# Patient Record
Sex: FEMALE | Race: WHITE | NOT HISPANIC OR LATINO | Employment: UNEMPLOYED | URBAN - METROPOLITAN AREA
[De-identification: names, ages, dates, MRNs, and addresses within clinical notes are randomized per-mention and may not be internally consistent; named-entity substitution may affect disease eponyms.]

---

## 2020-05-26 ENCOUNTER — TRANSFERRED RECORDS (OUTPATIENT)
Dept: HEALTH INFORMATION MANAGEMENT | Facility: CLINIC | Age: 45
End: 2020-05-26

## 2020-05-31 ENCOUNTER — HOSPITAL ENCOUNTER (EMERGENCY)
Facility: CLINIC | Age: 45
Discharge: STILL ADMITTED OR ADMINISTRATIVE | End: 2020-05-31

## 2020-07-03 ENCOUNTER — HOSPITAL ENCOUNTER (INPATIENT)
Facility: CLINIC | Age: 45
LOS: 3 days | Discharge: HOME | End: 2020-07-06
Attending: FAMILY MEDICINE | Admitting: PSYCHIATRY & NEUROLOGY
Payer: COMMERCIAL

## 2020-07-03 ENCOUNTER — TELEPHONE (OUTPATIENT)
Dept: BEHAVIORAL HEALTH | Facility: CLINIC | Age: 45
End: 2020-07-03

## 2020-07-03 DIAGNOSIS — F13.20 BENZODIAZEPINE DEPENDENCE (H): ICD-10-CM

## 2020-07-03 DIAGNOSIS — F15.10 AMPHETAMINE OR STIMULANT DRUG ABUSE (H): ICD-10-CM

## 2020-07-03 DIAGNOSIS — Z20.822 SEVERE ACUTE RESPIRATORY SYNDROME CORONAVIRUS 2 (SARS-COV-2) INFECTION RULED OUT BY LABORATORY TESTING: ICD-10-CM

## 2020-07-03 DIAGNOSIS — F19.10 POLYSUBSTANCE ABUSE (H): ICD-10-CM

## 2020-07-03 PROBLEM — F13.939 WITHDRAWAL FROM BENZODIAZEPINE, WITH UNSPECIFIED COMPLICATION (H): Status: ACTIVE | Noted: 2020-07-03

## 2020-07-03 LAB
ALBUMIN SERPL-MCNC: 3.7 G/DL (ref 3.4–5)
ALP SERPL-CCNC: 71 U/L (ref 40–150)
ALT SERPL W P-5'-P-CCNC: 25 U/L (ref 0–50)
AMPHETAMINES UR QL SCN: POSITIVE
ANION GAP SERPL CALCULATED.3IONS-SCNC: 4 MMOL/L (ref 3–14)
AST SERPL W P-5'-P-CCNC: 19 U/L (ref 0–45)
BARBITURATES UR QL: NEGATIVE
BASOPHILS # BLD AUTO: 0 10E9/L (ref 0–0.2)
BASOPHILS NFR BLD AUTO: 0.5 %
BENZODIAZ UR QL: POSITIVE
BILIRUB SERPL-MCNC: 0.3 MG/DL (ref 0.2–1.3)
BUN SERPL-MCNC: 21 MG/DL (ref 7–30)
CALCIUM SERPL-MCNC: 8.6 MG/DL (ref 8.5–10.1)
CANNABINOIDS UR QL SCN: NEGATIVE
CHLORIDE SERPL-SCNC: 108 MMOL/L (ref 94–109)
CO2 SERPL-SCNC: 26 MMOL/L (ref 20–32)
COCAINE UR QL: NEGATIVE
CREAT SERPL-MCNC: 0.76 MG/DL (ref 0.52–1.04)
DIFFERENTIAL METHOD BLD: ABNORMAL
EOSINOPHIL # BLD AUTO: 0.1 10E9/L (ref 0–0.7)
EOSINOPHIL NFR BLD AUTO: 1.9 %
ERYTHROCYTE [DISTWIDTH] IN BLOOD BY AUTOMATED COUNT: 12.3 % (ref 10–15)
ETHANOL UR QL SCN: NEGATIVE
GFR SERPL CREATININE-BSD FRML MDRD: >90 ML/MIN/{1.73_M2}
GLUCOSE SERPL-MCNC: 80 MG/DL (ref 70–99)
HCG UR QL: NEGATIVE
HCT VFR BLD AUTO: 38.6 % (ref 35–47)
HGB BLD-MCNC: 12.7 G/DL (ref 11.7–15.7)
IMM GRANULOCYTES # BLD: 0 10E9/L (ref 0–0.4)
IMM GRANULOCYTES NFR BLD: 0 %
LYMPHOCYTES # BLD AUTO: 2.2 10E9/L (ref 0.8–5.3)
LYMPHOCYTES NFR BLD AUTO: 51.5 %
MCH RBC QN AUTO: 29.1 PG (ref 26.5–33)
MCHC RBC AUTO-ENTMCNC: 32.9 G/DL (ref 31.5–36.5)
MCV RBC AUTO: 89 FL (ref 78–100)
MONOCYTES # BLD AUTO: 0.4 10E9/L (ref 0–1.3)
MONOCYTES NFR BLD AUTO: 10.1 %
NEUTROPHILS # BLD AUTO: 1.5 10E9/L (ref 1.6–8.3)
NEUTROPHILS NFR BLD AUTO: 36 %
NRBC # BLD AUTO: 0 10*3/UL
NRBC BLD AUTO-RTO: 0 /100
OPIATES UR QL SCN: NEGATIVE
PLATELET # BLD AUTO: 171 10E9/L (ref 150–450)
POTASSIUM SERPL-SCNC: 4.5 MMOL/L (ref 3.4–5.3)
PROT SERPL-MCNC: 7.7 G/DL (ref 6.8–8.8)
RBC # BLD AUTO: 4.36 10E12/L (ref 3.8–5.2)
SARS-COV-2 PCR COMMENT: NORMAL
SARS-COV-2 RNA SPEC QL NAA+PROBE: NEGATIVE
SARS-COV-2 RNA SPEC QL NAA+PROBE: NORMAL
SODIUM SERPL-SCNC: 138 MMOL/L (ref 133–144)
SPECIMEN SOURCE: NORMAL
SPECIMEN SOURCE: NORMAL
WBC # BLD AUTO: 4.3 10E9/L (ref 4–11)

## 2020-07-03 PROCEDURE — 80320 DRUG SCREEN QUANTALCOHOLS: CPT | Performed by: FAMILY MEDICINE

## 2020-07-03 PROCEDURE — C9803 HOPD COVID-19 SPEC COLLECT: HCPCS | Performed by: FAMILY MEDICINE

## 2020-07-03 PROCEDURE — 25000132 ZZH RX MED GY IP 250 OP 250 PS 637: Performed by: FAMILY MEDICINE

## 2020-07-03 PROCEDURE — 99285 EMERGENCY DEPT VISIT HI MDM: CPT | Performed by: FAMILY MEDICINE

## 2020-07-03 PROCEDURE — 99284 EMERGENCY DEPT VISIT MOD MDM: CPT | Mod: Z6 | Performed by: FAMILY MEDICINE

## 2020-07-03 PROCEDURE — 36415 COLL VENOUS BLD VENIPUNCTURE: CPT | Performed by: FAMILY MEDICINE

## 2020-07-03 PROCEDURE — 80053 COMPREHEN METABOLIC PANEL: CPT | Performed by: FAMILY MEDICINE

## 2020-07-03 PROCEDURE — 12800008 ZZH R&B CD ADULT

## 2020-07-03 PROCEDURE — U0003 INFECTIOUS AGENT DETECTION BY NUCLEIC ACID (DNA OR RNA); SEVERE ACUTE RESPIRATORY SYNDROME CORONAVIRUS 2 (SARS-COV-2) (CORONAVIRUS DISEASE [COVID-19]), AMPLIFIED PROBE TECHNIQUE, MAKING USE OF HIGH THROUGHPUT TECHNOLOGIES AS DESCRIBED BY CMS-2020-01-R: HCPCS | Performed by: FAMILY MEDICINE

## 2020-07-03 PROCEDURE — 85025 COMPLETE CBC W/AUTO DIFF WBC: CPT | Performed by: FAMILY MEDICINE

## 2020-07-03 PROCEDURE — 81025 URINE PREGNANCY TEST: CPT | Performed by: FAMILY MEDICINE

## 2020-07-03 PROCEDURE — 80307 DRUG TEST PRSMV CHEM ANLYZR: CPT | Performed by: FAMILY MEDICINE

## 2020-07-03 PROCEDURE — 25000132 ZZH RX MED GY IP 250 OP 250 PS 637: Performed by: PSYCHIATRY & NEUROLOGY

## 2020-07-03 RX ORDER — BUPRENORPHINE AND NALOXONE 4; 1 MG/1; MG/1
1 FILM, SOLUBLE BUCCAL; SUBLINGUAL DAILY
COMMUNITY
End: 2020-07-03

## 2020-07-03 RX ORDER — BUPRENORPHINE AND NALOXONE 8; 2 MG/1; MG/1
1 FILM, SOLUBLE BUCCAL; SUBLINGUAL 2 TIMES DAILY
Status: ON HOLD | COMMUNITY
End: 2020-07-06

## 2020-07-03 RX ORDER — DEXTROAMPHETAMINE SULFATE 20 MG/1
40 TABLET ORAL
COMMUNITY
End: 2020-07-03

## 2020-07-03 RX ORDER — PHENOBARBITAL 32.4 MG/1
32.4 TABLET ORAL 3 TIMES DAILY
Status: COMPLETED | OUTPATIENT
Start: 2020-07-03 | End: 2020-07-04

## 2020-07-03 RX ORDER — ALUMINA, MAGNESIA, AND SIMETHICONE 2400; 2400; 240 MG/30ML; MG/30ML; MG/30ML
30 SUSPENSION ORAL EVERY 4 HOURS PRN
Status: DISCONTINUED | OUTPATIENT
Start: 2020-07-03 | End: 2020-07-06 | Stop reason: HOSPADM

## 2020-07-03 RX ORDER — ONDANSETRON 4 MG/1
4 TABLET, ORALLY DISINTEGRATING ORAL EVERY 6 HOURS PRN
Status: DISCONTINUED | OUTPATIENT
Start: 2020-07-03 | End: 2020-07-06 | Stop reason: HOSPADM

## 2020-07-03 RX ORDER — NICOTINE 21 MG/24HR
1 PATCH, TRANSDERMAL 24 HOURS TRANSDERMAL DAILY
Status: DISCONTINUED | OUTPATIENT
Start: 2020-07-03 | End: 2020-07-06 | Stop reason: HOSPADM

## 2020-07-03 RX ORDER — ACETAMINOPHEN 325 MG/1
650 TABLET ORAL EVERY 4 HOURS PRN
Status: DISCONTINUED | OUTPATIENT
Start: 2020-07-03 | End: 2020-07-06 | Stop reason: HOSPADM

## 2020-07-03 RX ORDER — IBUPROFEN 600 MG/1
600 TABLET, FILM COATED ORAL EVERY 6 HOURS PRN
Status: DISCONTINUED | OUTPATIENT
Start: 2020-07-03 | End: 2020-07-06 | Stop reason: HOSPADM

## 2020-07-03 RX ORDER — BUPRENORPHINE AND NALOXONE 8; 2 MG/1; MG/1
1 FILM, SOLUBLE BUCCAL; SUBLINGUAL 2 TIMES DAILY
Status: DISCONTINUED | OUTPATIENT
Start: 2020-07-03 | End: 2020-07-06 | Stop reason: HOSPADM

## 2020-07-03 RX ORDER — LOPERAMIDE HCL 2 MG
2 CAPSULE ORAL 4 TIMES DAILY PRN
Status: DISCONTINUED | OUTPATIENT
Start: 2020-07-03 | End: 2020-07-06 | Stop reason: HOSPADM

## 2020-07-03 RX ORDER — TRAZODONE HYDROCHLORIDE 50 MG/1
50 TABLET, FILM COATED ORAL
Status: DISCONTINUED | OUTPATIENT
Start: 2020-07-03 | End: 2020-07-06 | Stop reason: HOSPADM

## 2020-07-03 RX ORDER — DEXTROAMPHETAMINE SULFATE 15 MG/1
30 CAPSULE, EXTENDED RELEASE ORAL DAILY
Status: ON HOLD | COMMUNITY
End: 2020-07-06

## 2020-07-03 RX ORDER — ZOLPIDEM TARTRATE 10 MG/1
10 TABLET ORAL
Status: ON HOLD | COMMUNITY
End: 2020-07-06

## 2020-07-03 RX ORDER — DEXTROAMPHETAMINE SULFATE 10 MG/1
10 TABLET ORAL DAILY
Status: ON HOLD | COMMUNITY
End: 2020-07-06

## 2020-07-03 RX ORDER — HYDROXYZINE HYDROCHLORIDE 25 MG/1
25 TABLET, FILM COATED ORAL EVERY 4 HOURS PRN
Status: DISCONTINUED | OUTPATIENT
Start: 2020-07-03 | End: 2020-07-06 | Stop reason: HOSPADM

## 2020-07-03 RX ORDER — ONDANSETRON 8 MG/1
8 TABLET, ORALLY DISINTEGRATING ORAL EVERY 8 HOURS PRN
Status: ON HOLD | COMMUNITY
End: 2020-07-06

## 2020-07-03 RX ADMIN — PHENOBARBITAL 32.4 MG: 32.4 TABLET ORAL at 22:38

## 2020-07-03 RX ADMIN — NICOTINE POLACRILEX 4 MG: 4 GUM, CHEWING BUCCAL at 19:13

## 2020-07-03 RX ADMIN — BUPRENORPHINE AND NALOXONE 1 FILM: 8; 2 FILM, SOLUBLE BUCCAL; SUBLINGUAL at 22:38

## 2020-07-03 ASSESSMENT — ACTIVITIES OF DAILY LIVING (ADL)
BATHING: 0-->INDEPENDENT
AMBULATION: 0-->INDEPENDENT
LAUNDRY: WITH SUPERVISION
COGNITION: 0 - NO COGNITION ISSUES REPORTED
TOILETING: 0-->INDEPENDENT
ORAL_HYGIENE: INDEPENDENT
RETIRED_COMMUNICATION: 0-->UNDERSTANDS/COMMUNICATES WITHOUT DIFFICULTY
DRESS: INDEPENDENT
DRESS: 0-->INDEPENDENT
SWALLOWING: 0-->SWALLOWS FOODS/LIQUIDS WITHOUT DIFFICULTY
FALL_HISTORY_WITHIN_LAST_SIX_MONTHS: NO
HYGIENE/GROOMING: INDEPENDENT
TRANSFERRING: 0-->INDEPENDENT
RETIRED_EATING: 0-->INDEPENDENT

## 2020-07-03 NOTE — ED PROVIDER NOTES
"    [facility] EMERGENCY DEPARTMENT ([facility])     July 3, 2020    History     Chief Complaint   Patient presents with     Addiction Problem     detox from Benzos; 10-13 tablets daily.     The history is provided by the patient and medical records.     Brenda Ballesteros is a 44 year old female with a past medical history significant for benzodiazepine abuse and  who presents to the Emergency Department seeking detoxification from benzodiazepines.    Patient reports she last used benzodiazepines 1 hour PTA. She states there has been ongoing use for approximately a year.  Patient states she averages about 10 pills and is unsure of the components.  She notes the name \"triazolam \"and orders them from the Internet.  Patient reports she gets sweaty, restless and anxious when she stops taking them.  She notes she has never been off of them long enough to get withdrawal seizures.  Patient also reports using Suboxone prescribed to her from [facility].  She notes she wants to continue it if possible.  Patient reports using methamphetamine concurrently with benzodiazepines.  She states her last use was yesterday.  Patient states she usually ingests it.  Patient denies alcohol use.      PAST MEDICAL HISTORY: No past medical history on file.    PAST SURGICAL HISTORY: No past surgical history on file.    Past medical history, past surgical history, medications, and allergies were reviewed with the patient. Additional pertinent items: None    FAMILY HISTORY: No family history on file.    SOCIAL HISTORY:   Social History     Tobacco Use     Smoking status: Current Every Day Smoker     Packs/day: 0.50   Substance Use Topics     Alcohol use: No     Social history was reviewed with the patient. Additional pertinent items: None      Patient's Medications   New Prescriptions    No medications on file   Previous Medications    ASCORBIC ACID (VITAMIN C) 500 MG CHEW    Take by mouth daily    BUPRENORPHINE HCL-NALOXONE HCL " (SUBOXONE) 8-2 MG PER FILM    Place 1 Film under the tongue daily    DEXTROAMPHETAMINE (DEXEDRINE SPANSULE) 15 MG 24 HR CAPSULE    Take 30 mg by mouth daily    DEXTROAMPHETAMINE (DEXTROSTAT) 10 MG TABLET    Take 10 mg by mouth daily    ONDANSETRON (ZOFRAN-ODT) 8 MG ODT TAB    Take 8 mg by mouth every 8 hours as needed for nausea   Modified Medications    No medications on file   Discontinued Medications    BUPRENORPHINE HCL-NALOXONE HCL (SUBOXONE) 4-1 MG PER FILM    Place 1 Film under the tongue daily    DEXTROAMPHETAMINE SULFATE 20 MG TABS    Take 40 mg by mouth        No Known Allergies     Review of Systems   Psychiatric/Behavioral:        Positive for seeking detoxification for benzo addication   All other systems reviewed and are negative.    A complete review of systems was performed with pertinent positives and negatives noted in the HPI, and all other systems negative.    Physical Exam   BP: (!) 128/103  Pulse: 102  Temp: 97.8  F (36.6  C)  Resp: 16  Weight: (pt refused)  SpO2: 98 %      Physical Exam  Constitutional:       General: She is not in acute distress.     Appearance: She is not diaphoretic.   HENT:      Head: Atraumatic.      Mouth/Throat:      Pharynx: No oropharyngeal exudate.   Eyes:      General: No scleral icterus.     Pupils: Pupils are equal, round, and reactive to light.   Cardiovascular:      Heart sounds: Normal heart sounds.   Pulmonary:      Effort: No respiratory distress.      Breath sounds: Normal breath sounds.   Abdominal:      General: Bowel sounds are normal.      Palpations: Abdomen is soft.      Tenderness: There is no abdominal tenderness.   Musculoskeletal:         General: No tenderness.   Skin:     General: Skin is warm.      Findings: No rash.         ED Course   6:16 PM  The patient was seen and examined by Dr. Weller in Room ED16B.        Procedures                           Results for orders placed or performed during the hospital encounter of 07/03/20 (from the past  24 hour(s))   HCG qualitative urine (UPT)   Result Value Ref Range    HCG Qual Urine Negative NEG^Negative   Drug abuse screen 6 urine (chem dep)   Result Value Ref Range    Amphetamine Qual Urine Positive (A) NEG^Negative    Barbiturates Qual Urine Negative NEG^Negative    Benzodiazepine Qual Urine Positive (A) NEG^Negative    Cannabinoids Qual Urine Negative NEG^Negative    Cocaine Qual Urine Negative NEG^Negative    Ethanol Qual Urine Negative NEG^Negative    Opiates Qualitative Urine Negative NEG^Negative   CBC with platelets differential   Result Value Ref Range    WBC 4.3 4.0 - 11.0 10e9/L    RBC Count 4.36 3.8 - 5.2 10e12/L    Hemoglobin 12.7 11.7 - 15.7 g/dL    Hematocrit 38.6 35.0 - 47.0 %    MCV 89 78 - 100 fl    MCH 29.1 26.5 - 33.0 pg    MCHC 32.9 31.5 - 36.5 g/dL    RDW 12.3 10.0 - 15.0 %    Platelet Count 171 150 - 450 10e9/L    Diff Method Automated Method     % Neutrophils 36.0 %    % Lymphocytes 51.5 %    % Monocytes 10.1 %    % Eosinophils 1.9 %    % Basophils 0.5 %    % Immature Granulocytes 0.0 %    Nucleated RBCs 0 0 /100    Absolute Neutrophil 1.5 (L) 1.6 - 8.3 10e9/L    Absolute Lymphocytes 2.2 0.8 - 5.3 10e9/L    Absolute Monocytes 0.4 0.0 - 1.3 10e9/L    Absolute Eosinophils 0.1 0.0 - 0.7 10e9/L    Absolute Basophils 0.0 0.0 - 0.2 10e9/L    Abs Immature Granulocytes 0.0 0 - 0.4 10e9/L    Absolute Nucleated RBC 0.0    Comprehensive metabolic panel   Result Value Ref Range    Sodium 138 133 - 144 mmol/L    Potassium 4.5 3.4 - 5.3 mmol/L    Chloride 108 94 - 109 mmol/L    Carbon Dioxide 26 20 - 32 mmol/L    Anion Gap 4 3 - 14 mmol/L    Glucose 80 70 - 99 mg/dL    Urea Nitrogen 21 7 - 30 mg/dL    Creatinine 0.76 0.52 - 1.04 mg/dL    GFR Estimate >90 >60 mL/min/[1.73_m2]    GFR Estimate If Black >90 >60 mL/min/[1.73_m2]    Calcium 8.6 8.5 - 10.1 mg/dL    Bilirubin Total 0.3 0.2 - 1.3 mg/dL    Albumin 3.7 3.4 - 5.0 g/dL    Protein Total 7.7 6.8 - 8.8 g/dL    Alkaline Phosphatase 71 40 - 150 U/L     ALT 25 0 - 50 U/L    AST 19 0 - 45 U/L     Medications   nicotine polacrilex (NICORETTE) gum 4 mg (4 mg Buccal Given 7/3/20 1913)             Assessments & Plan (with Medical Decision Making)   44-year-old woman with a history of polysubstance abuse is here seeking detox from benzodiazepines.  There is a long history of habitual use and physical dependence.  The patient has also been on Suboxone maintenance and would like to continue that if possible.  In the emergency department she is pleasant, cooperative, and does not appear significantly intoxicated or in withdrawal.  She denies other medical or psychiatric concerns.  Her labs reveal amphetamines and benzodiazepines in the urine, otherwise unremarkable.  She appears to be medically stable and an appropriate candidate for voluntary detox admission.    I have reviewed the nursing notes.    I have reviewed the findings, diagnosis, plan and need for follow up with the patient.    New Prescriptions    No medications on file       Final diagnoses:   Benzodiazepine dependence (H)   Polysubstance abuse (H)     I, John To, am serving as a trained medical scribe to document services personally performed by Anthony York MD, based on the provider's statements to me.     IAnthony MD, was physically present and have reviewed and verified the accuracy of this note documented by John To.    7/3/2020   [facility], [place], EMERGENCY DEPARTMENT     Anthony York MD  07/03/20 2021

## 2020-07-03 NOTE — TELEPHONE ENCOUNTER
S: [place], [place] [facility], 44/F, benzo detox     B: Pt reports using a kevon of xanex, triazalam, clonopin for about the last year, daily 10-15 tablets based on what she can get ahold of   ALMAZ an hour before she came in   Pt reports she takes them right in the morning otherwise she will get sweaty  Pt reports she is on suboxone through [facility] in [facility] - pt reports she would like to continue the suboxone  Pt reports meth use, ALMAZ yesterday via ingestion, no IV use  Pt denies medical or MH concerns     Medically cleared, eating, drinking, ambulating independently   Patient cleared and ready for behavioral bed placement: Yes   No covid symptoms, swab is ordered    A: Voluntary     R: 3A/Jose    637pm - Intake awaiting lab collection and results   747pm - All labs resulted, Jose, on call provider, paged   749pm - Jose accepts  Pt placed in que   752pm - unit charge notified, 830pm for report  754pm - ED notified via text page
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
## 2020-07-04 PROCEDURE — 99207 ZZC CDG-CODE CATEGORY CHANGED: CPT | Performed by: PHYSICIAN ASSISTANT

## 2020-07-04 PROCEDURE — 99221 1ST HOSP IP/OBS SF/LOW 40: CPT | Performed by: PHYSICIAN ASSISTANT

## 2020-07-04 PROCEDURE — 99222 1ST HOSP IP/OBS MODERATE 55: CPT | Mod: 95 | Performed by: PSYCHIATRY & NEUROLOGY

## 2020-07-04 PROCEDURE — 12800008 ZZH R&B CD ADULT

## 2020-07-04 PROCEDURE — 25000132 ZZH RX MED GY IP 250 OP 250 PS 637: Performed by: PSYCHIATRY & NEUROLOGY

## 2020-07-04 RX ORDER — PHENOBARBITAL 32.4 MG/1
32.4 TABLET ORAL 2 TIMES DAILY
Status: COMPLETED | OUTPATIENT
Start: 2020-07-05 | End: 2020-07-06

## 2020-07-04 RX ORDER — PHENOBARBITAL 32.4 MG/1
32.4 TABLET ORAL 2 TIMES DAILY
Status: DISCONTINUED | OUTPATIENT
Start: 2020-07-05 | End: 2020-07-04

## 2020-07-04 RX ADMIN — NICOTINE 1 PATCH: 21 PATCH, EXTENDED RELEASE TRANSDERMAL at 08:23

## 2020-07-04 RX ADMIN — PHENOBARBITAL 32.4 MG: 32.4 TABLET ORAL at 14:10

## 2020-07-04 RX ADMIN — PHENOBARBITAL 32.4 MG: 32.4 TABLET ORAL at 08:23

## 2020-07-04 RX ADMIN — BUPRENORPHINE AND NALOXONE 1 FILM: 8; 2 FILM, SOLUBLE BUCCAL; SUBLINGUAL at 20:18

## 2020-07-04 RX ADMIN — PHENOBARBITAL 32.4 MG: 32.4 TABLET ORAL at 20:18

## 2020-07-04 RX ADMIN — TRAZODONE HYDROCHLORIDE 50 MG: 50 TABLET ORAL at 20:35

## 2020-07-04 RX ADMIN — BUPRENORPHINE AND NALOXONE 1 FILM: 8; 2 FILM, SOLUBLE BUCCAL; SUBLINGUAL at 08:22

## 2020-07-04 ASSESSMENT — ACTIVITIES OF DAILY LIVING (ADL)
DRESS: INDEPENDENT
ORAL_HYGIENE: INDEPENDENT
HYGIENE/GROOMING: HANDWASHING;INDEPENDENT

## 2020-07-04 NOTE — H&P
"[facility], [place]   Psychiatric History and Physical  Admission date: 7/3/2020        Chief Complaint:   \"Not doing so well.\"         HPI:     The patient is a 43yo female with a history of benzodiazepine use disorder and opiate use disorder on Suboxone maintenance who was admitted for benzodiazepine withdrawal. Says that she is \"upset with the way they handled my intake.\" Says that staff went through her belongings without her present. Did sign a 72-hour intent to leave but is willing to stay through the weekend to detoxify. Says that she is going to do CD treatment at [place]. Asks about using her own shampoo and conditioner. Mood is \"upset.\" Denies SI or HI. Sleeping well.      Per ER:  Patient reports she last used benzodiazepines 1 hour PTA. She states there has been ongoing use for approximately a year.  Patient states she averages about 10 pills and is unsure of the components.  She notes the name \"triazolam \"and orders them from the Internet.  Patient reports she gets sweaty, restless and anxious when she stops taking them.  She notes she has never been off of them long enough to get withdrawal seizures.  Patient also reports using Suboxone prescribed to her from [facility].  She notes she wants to continue it if possible.  Patient reports using methamphetamine concurrently with benzodiazepines.  She states her last use was yesterday.  Patient states she usually ingests it.  Patient denies alcohol use.        Past Psychiatric History:     No history of suicide attempts.         Substance Use and History:     Opiate use disorder on Suboxone 8mg BID.   Benzodiazepine use disorder        Past Medical History:   PAST MEDICAL HISTORY: No past medical history on file.    PAST SURGICAL HISTORY: No past surgical history on file.          Family History:   FAMILY HISTORY: Denies mental illness or chemical dependency in her family.         Social History:   Please see the full " psychosocial profile from the clinical treatment coordinator.   SOCIAL HISTORY:   Social History     Tobacco Use     Smoking status: Current Every Day Smoker     Packs/day: 0.50   Substance Use Topics     Alcohol use: No            Physical ROS:   The 10-point review of systems was negative except as noted in HPI.         PTA Medications:     Medications Prior to Admission   Medication Sig Dispense Refill Last Dose     Ascorbic Acid (VITAMIN C) 500 MG CHEW Take 500 mg by mouth daily    Past Month     buprenorphine HCl-naloxone HCl (SUBOXONE) 8-2 MG per film Place 1 Film under the tongue 2 times daily    7/3/2020     dextroamphetamine (DEXEDRINE SPANSULE) 15 MG 24 hr capsule Take 30 mg by mouth daily   7/2/2020 at Unknown time     dextroamphetamine (DEXTROSTAT) 10 MG tablet Take 10 mg by mouth daily   7/2/2020 at Unknown time     zolpidem (AMBIEN) 10 MG tablet Take 10 mg by mouth nightly as needed for sleep        ondansetron (ZOFRAN-ODT) 8 MG ODT tab Take 8 mg by mouth every 8 hours as needed for nausea   More than a month          Allergies:     Allergies   Allergen Reactions     Lamictal [Lamotrigine] Hives          Labs:     Recent Results (from the past 48 hour(s))   HCG qualitative urine (UPT)    Collection Time: 07/03/20  6:15 PM   Result Value Ref Range    HCG Qual Urine Negative NEG^Negative   Drug abuse screen 6 urine (chem dep)    Collection Time: 07/03/20  6:15 PM   Result Value Ref Range    Amphetamine Qual Urine Positive (A) NEG^Negative    Barbiturates Qual Urine Negative NEG^Negative    Benzodiazepine Qual Urine Positive (A) NEG^Negative    Cannabinoids Qual Urine Negative NEG^Negative    Cocaine Qual Urine Negative NEG^Negative    Ethanol Qual Urine Negative NEG^Negative    Opiates Qualitative Urine Negative NEG^Negative   CBC with platelets differential    Collection Time: 07/03/20  6:25 PM   Result Value Ref Range    WBC 4.3 4.0 - 11.0 10e9/L    RBC Count 4.36 3.8 - 5.2 10e12/L    Hemoglobin 12.7  11.7 - 15.7 g/dL    Hematocrit 38.6 35.0 - 47.0 %    MCV 89 78 - 100 fl    MCH 29.1 26.5 - 33.0 pg    MCHC 32.9 31.5 - 36.5 g/dL    RDW 12.3 10.0 - 15.0 %    Platelet Count 171 150 - 450 10e9/L    Diff Method Automated Method     % Neutrophils 36.0 %    % Lymphocytes 51.5 %    % Monocytes 10.1 %    % Eosinophils 1.9 %    % Basophils 0.5 %    % Immature Granulocytes 0.0 %    Nucleated RBCs 0 0 /100    Absolute Neutrophil 1.5 (L) 1.6 - 8.3 10e9/L    Absolute Lymphocytes 2.2 0.8 - 5.3 10e9/L    Absolute Monocytes 0.4 0.0 - 1.3 10e9/L    Absolute Eosinophils 0.1 0.0 - 0.7 10e9/L    Absolute Basophils 0.0 0.0 - 0.2 10e9/L    Abs Immature Granulocytes 0.0 0 - 0.4 10e9/L    Absolute Nucleated RBC 0.0    Comprehensive metabolic panel    Collection Time: 07/03/20  6:25 PM   Result Value Ref Range    Sodium 138 133 - 144 mmol/L    Potassium 4.5 3.4 - 5.3 mmol/L    Chloride 108 94 - 109 mmol/L    Carbon Dioxide 26 20 - 32 mmol/L    Anion Gap 4 3 - 14 mmol/L    Glucose 80 70 - 99 mg/dL    Urea Nitrogen 21 7 - 30 mg/dL    Creatinine 0.76 0.52 - 1.04 mg/dL    GFR Estimate >90 >60 mL/min/[1.73_m2]    GFR Estimate If Black >90 >60 mL/min/[1.73_m2]    Calcium 8.6 8.5 - 10.1 mg/dL    Bilirubin Total 0.3 0.2 - 1.3 mg/dL    Albumin 3.7 3.4 - 5.0 g/dL    Protein Total 7.7 6.8 - 8.8 g/dL    Alkaline Phosphatase 71 40 - 150 U/L    ALT 25 0 - 50 U/L    AST 19 0 - 45 U/L   Asymptomatic COVID-19 Virus (Coronavirus) by PCR    Collection Time: 07/03/20  6:25 PM    Specimen: Nasopharyngeal   Result Value Ref Range    COVID-19 Virus PCR to [facility] - Source Nasopharyngeal     COVID-19 Virus PCR to [facility] - Result       Test received-See reflex to IDDL test SARS CoV2 (COVID-19) Virus RT-PCR   SARS-CoV-2 COVID-19 Virus (Coronavirus) RT-PCR Nasopharyngeal    Collection Time: 07/03/20  6:25 PM    Specimen: Nasopharyngeal   Result Value Ref Range    SARS-CoV-2 Virus Specimen Source Nasopharyngeal     SARS-CoV-2 PCR Result NEGATIVE     SARS-CoV-2 PCR  "Comment       Testing was performed using the Xpert Xpress SARS-CoV-2 Assay on the [employer] Gene-Xpert   Instrument Systems. Additional information about this Emergency Use Authorization (EUA)   assay can be found via the Lab Guide.            Physical and Psychiatric Examination:     /67   Pulse 63   Temp 98.4  F (36.9  C) (Temporal)   Resp 16   Ht 1.753 m (5' 9\")   SpO2 97%   Weight is 0 lbs 0 oz  There is no height or weight on file to calculate BMI.    Physical Exam:  I have reviewed the physical exam as documented by by the medical team and agree with findings and assessment and have no additional findings to add at this time.    Mental Status Exam:  Appearance: awake, alert and adequately groomed  Attitude:  more cooperative  Eye Contact:  good  Mood:  better  Affect:  mood congruent  Speech:  clear, coherent  Language: fluent and intact in English  Psychomotor, Gait, Musculoskeletal:  no evidence of tardive dyskinesia, dystonia, or tics  Thought Process:  goal oriented  Associations:  no loose associations  Thought Content:  no evidence of suicidal ideation or homicidal ideation and no evidence of psychotic thought  Insight:  fair  Judgement:  fair  Oriented to:  time, person, and place  Attention Span and Concentration:  intact  Recent and Remote Memory:  fair  Fund of Knowledge:  appropriate         Admission Diagnoses:      Benzodiazepine dependence (H)  Opiate use disorder         Assessment & Plan:     1) Continue Suboxone 8mg BID.   2) The patient will be detoxified with phenobarbital. 32.4mg TID today, BID tomorrow and daily on Monday. Likely discharge on Monday.   3) Okay for patient to have a handful of home supply of shampoo/conditioner with shower.     Disposition Plan   Reason for ongoing admission: requires detoxification from substance that poses a risk of bodily harm during withdrawal period  Discharge location: Chemical dependency treatment facility  Discharge Medications: not " ordered  Follow-up Appointments: not scheduled  Legal Status: voluntary    Telemedicine Visit: The patient's condition can be safely assessed and treated via synchronous audio and visual telemedicine encounter.      Start Time: 1015  Stop Time: 1025    Reason for Telemedicine Visit: Covid-19    Originating Site (Patient Location): [facility]    Distant Site (Provider Location): Provider home office    Consent:  The patient/guardian has verbally consented to: the potential risks and benefits of telemedicine (video visit) versus in person care; bill my insurance or make self-payment for services provided; and responsibility for payment of non-covered services.     Mode of Communication:  Video Conference via [employer]    As the provider I attest to compliance with applicable laws and regulations related to telemedicine.     Entered by: Thierry Garcia on 7/4/2020 at 11:31 AM

## 2020-07-04 NOTE — PROGRESS NOTES
"Pt irritable and tearful this am, stating that she wants to \"leave right now or she is really going to get pissed off.  This place is for crazy people and I'm not crazy.  I have a bed at [place] and I want to leave right now.\" Pt had signed a 72 hour intent last evening shortly after arrival and on call provider was notified and will see patient today. \"Call him right now.The food here sucks and last night I was treated terrible.\" I explained that he would be calling in shortly and will see her on telemedicine.  Pt reluctantly accepted this, took her am medications and has been on the phone.  Will continue to monitor, reassure and discuss with Dr. Garcia.   "

## 2020-07-04 NOTE — PROGRESS NOTES
[facility] evaluation counselor met with pt to initiate discharge planning.  Pt has had a substance use evaluation done at [facility] in less than 45 days. Pt has signed an MICHAEL requested her assement be sent to [facility] detox. Pt desires to go to [facility] because of her 6 years old duaghter.  Pt's living situation is  Living arrangements: House (renting from someone). Pt's funding source is insurance list [facility]: [place].    SANIA Amezcua

## 2020-07-04 NOTE — PLAN OF CARE
"  Problem: Substance Withdrawal  Goal: Substance Withdrawal  Description: Signs and symptoms of listed problems will be absent or manageable.  Outcome: No Change    S: Pt admitted for Benzodiazepines withdrawal, continued on Suboxone maintenance.  A: MSSA 10, /71   Pulse 93   Temp 97.3  F (36.3  C) (Temporal)   Resp 16   Ht 1.753 m (5' 9\")   SpO2 97%  On scheduled phenobarbital and appears to be tolerating this dose.  Reports she did not sleep well, \"the food is terrible, \" she is tearful, anxious/tense and irritable but denies SI, HI. She has numerous requests for items she brought in from home.  Explained safety precautions and rationale for using hospital supplied items.  She has numerous complaints about the unit and the admission process.  Pt did mention that she had an assessment at [facility] and went there but was told they have no detox? She also mentioned CPS involved but did not go into details.     P:  I reassured her that she would be well taken care of here, will meet with a CM and be set up for treatment if she would like assistance. Will have BA go through belongings and give patient what she can have on the unit.  I did speak with Dr. Garcia and patient will not be discharged, he will see her this am. Will continue to monitor and assist with discharge planning.            "

## 2020-07-04 NOTE — CONSULTS
"  [facility]  Internal Medicine Consult     Brenda Ballesteros MRN# [id number]   Age: 44 year old YOB: 1975     Date of Admission: 7/3/2020  Date of Consult:  7/4/2020    Primary Care Provider: No Ref-Primary, Physician    Requesting Service: Psychiatry  Reason for Consult: General Medical Evaluation     Assessment and Plan/Recommendations:   Brenda Ballesteros is a 45yo F with a hx of benzodiazepine abuse and meth admitted seeking detox from benzos and meth.     Benzodiazepine abuse and withdrawal. No known hx of seizures from withdrawal but pt is certainly at risk. She is actively withdrawing at this time. Defer management to psych, primary team.     Meth abuse and withdrawal. Ingests meth, no IVDU.     Tobacco dependence. Cont nicotine patch.     HCG neg, COVID 19 neg, CMP and CBC unremarkable.     Currently, medically stable and I will be happy to follow up and see again for any intercurrent medical issues. Thank you for the opportunity to be a part of this patient's care.      Dahlia Andrade PA-C  Internal Medicine [facility] Hospitalist  [phone]  July 4, 2020    SUBJECTIVE   CC:   Benzo and meth abuse and withdrawal  \"I want to go home\"   HPI:   Brenda Ballesteros is a 45yo F with a hx of benzodiazepine abuse and meth admitted seeking detox from benzos and meth. Pt has been using ~10 pills of unknown dose of unknown drug name prior to admission. She also endorses using meth at the same time. She denies any IVDU, only ingestion. She has withdrawn from benzos before but denies any hx of withdrawal seizures. She reports that she is actively withdrawing at this time with some restlessness and anxiety, but also reports she doesn't want to be admitted anymore and would like to be discharged and that is stressing her out. She denies any known hx of cardiac or pulmonary illness. She denies any active chest pain, sob, dyspnea, palpitations, abdominal pain, nausea, vomiting, " Dr. Shanique Pierre    Intermittent FMLA for chronic headaches, 1-4 days per month    Please sign off on form:  -Highlight the patient and hit \"Chart\" button. -In Chart Review, w/in the Encounter tab - click 1 time on the Telephone call encounter for 8/30/19. "fevers or chills. She is tolerating po intake - ate all of her breakfast this morning without issue. She has no acute medical concerns at this time.      Past Medical History:   No past medical history on file.      Past Surgical History:    No past surgical history on file.      Social History:     Social History     Tobacco Use     Smoking status: Current Every Day Smoker     Packs/day: 0.50   Substance Use Topics     Alcohol use: No         Family History:   No family history on file.      Allergies:     Allergies   Allergen Reactions     Lamictal [Lamotrigine] Hives         Medications:   Reviewed. Please see MAR     Review of Systems:   10 point ROS of systems including Constitutional, Eyes, Respiratory, Cardiovascular, Gastroenterology, Genitourinary, Integumentary, Muscularskeletal, Psychiatric were all negative except for pertinent positives noted in my HPI.      OBJECTIVE   Physical Exam:   Vitals were reviewed  Blood pressure 120/75, pulse 68, temperature 97  F (36.1  C), temperature source Temporal, resp. rate 16, height 1.753 m (5' 9\"), SpO2 100 %.  General:alert, NAD, tearful, appears anxious, pleasant and cooperative  HEENT: MMM  Cardiovascular: RRR  Lungs:CTAB  Abdomen: + BS, soft with no distention and no tenderness   Vascular: trace peripheral edema, distal pulses palpable  Neurologic: AAO X 3, no focal deficits, no tremors appreciated in UE  Skin: no jaundice, rashes, or lesions on exposed areas of skin         Data:        Lab Results   Component Value Date     07/03/2020    Lab Results   Component Value Date    CHLORIDE 108 07/03/2020    Lab Results   Component Value Date    BUN 21 07/03/2020      Lab Results   Component Value Date    POTASSIUM 4.5 07/03/2020    Lab Results   Component Value Date    CO2 26 07/03/2020    Lab Results   Component Value Date    CR 0.76 07/03/2020        Lab Results   Component Value Date    WBC 4.3 07/03/2020    HGB 12.7 07/03/2020    HCT 38.6 07/03/2020    MCV " 89 07/03/2020     07/03/2020     Lab Results   Component Value Date    WBC 4.3 07/03/2020

## 2020-07-04 NOTE — PROGRESS NOTES
Large bin:   purse w/make up items, laptop, skill and hair products cig, keys, sandals,    Small bin:  Phone, wallet, necklace, ear rings    Valuables # [id number]  $56.00 cash, visa cards(2889,4985)  Master card(8157)  EBT(7568)  [place] IDs(3)  S.S cards(2)    Medication # [id number] pt nurse   07/03/20 3364   Patient Belongings   Did you bring any home meds/supplements to the hospital?  Yes   Disposition of meds  Sent to security/pharmacy per site process   Patient Belongings other (see comments)   Belongings Search Yes   Clothing Search Yes   Second Staff Denice RAMIRES RN   Comment See Notes     A               Admission:  I am responsible for any personal items that are not sent to the safe or pharmacy.  [facility] is not responsible for loss, theft or damage of any property in my possession.    Signature:  _________________________________ Date: _______  Time: _____                                              Staff Signature:  ____________________________ Date: ________  Time: _____      2nd Staff person, if patient is unable/unwilling to sign:    Signature: ________________________________ Date: ________  Time: _____     Discharge:  [facility] has returned all of my personal belongings:    Signature: _________________________________ Date: ________  Time: _____                                          Staff Signature:  ____________________________ Date: ________  Time: _____

## 2020-07-04 NOTE — PHARMACY-ADMISSION MEDICATION HISTORY
Admission Medication History Completed by Pharmacy    See [facility] Admission Navigator for allergy information, preferred outpatient pharmacy, prior to admission medications and immunization status.     Medication History Sources:     Patient, Surescript,     Changes made to PTA medication list (reason):    Added:     Ascorbic Acid     Ondansetron    Deleted: None    Changed:     Suboxone - 4-1 mg film >>> 8-2 mg film (per patient, consistent with dispense report)    Dextroamphetamine - 40 mg daily >>> 30 mg ER daily and 10 mg IR daily (per patient, consistent with dispense report)    Additional Information:    Patient was a reliable medication historian. Patient reports taking Suboxone every day but has not filled since 2/18/20 (60 films for 30 days). Patient also reports taking Sulfamethoxazole-TMP about a month ago for a cyst under her arm, but reports not finishing the full course of therapy and does not report any continued symptoms of infection.       Prior to Admission medications    Medication Sig Last Dose Taking? Auth Provider   Ascorbic Acid (VITAMIN C) 500 MG CHEW Take 500 mg by mouth daily  Past Month Yes Unknown, Entered By History   buprenorphine HCl-naloxone HCl (SUBOXONE) 8-2 MG per film Place 1 Film under the tongue daily 7/3/2020 Yes Unknown, Entered By History   dextroamphetamine (DEXEDRINE SPANSULE) 15 MG 24 hr capsule Take 30 mg by mouth daily 7/1/2020 Yes Unknown, Entered By History   dextroamphetamine (DEXTROSTAT) 10 MG tablet Take 10 mg by mouth daily 7/1/2020 Yes Unknown, Entered By History   ondansetron (ZOFRAN-ODT) 8 MG ODT tab Take 8 mg by mouth every 8 hours as needed for nausea More than a month  Unknown, Entered By History         Date completed: 07/03/20    Medication history completed by: Jose M Jensen

## 2020-07-04 NOTE — PLAN OF CARE
"[place]      Brenda Ballesteros is a 44 year old year old female with a chief complaint of addiction problem      S = Situation:   Patient voluntary admission for benzodiazepine withdrawal      B  = Background:   Patient admitted from the ER seeking detox from benzodiazepine abuse. Patient reports she takes xanax, clonazepam, or triazolam, 10-15 tablets daily for a year.Last use 7/3/2020 prior to coming to the ER .Unable to quantify milligrams. Patient also reports she uses methamphetamines \"habitually\", refused to qualify with how much. Last use 7/2/2020. Patient denies any history of withdrawal seizures from benzodiazepines. Patient reports she has been to one other  inpatient detox and treatment at [facility] and has had one OP CD treatment.Patient denies any mental health admissions or outpatient providers. Patient reports ADHD and past opiate abuse for a medical history.     A  =  Assessment:   Patient affect irritable, angry. Patient mood is irritable. Patient denies SI, HI, SIB, or hallucinations. Patient reported she doesn't like how the admission and check-in were handled. Patient reports she was unhappy about not watching staff search her belongings. Patient verbally disagrees with the mental health policies in place. Patient initially cooperative with writer, then demanded to be discharged. Accused writer of lying to her when she signed the  voluntary form. Patient signed a 72 hour intent to discharge. On-call physician notified. Patient unhappy that certain belongings were not given to her and stated she was assured she could use her laptop on the unit. Patient later threw items from her room into the hallway and ripped off her name band. MSSA 2 during admission assessment.    R =   Request or Recommendation:   Patient is on MSSA monitoring with phenobarbital for benzodiazepine abuse. Patient remains on suboxone maintenance. Is on withdrawal precautions. To be seen in am by psychiatry and internal " medicine.

## 2020-07-05 PROCEDURE — 25000132 ZZH RX MED GY IP 250 OP 250 PS 637: Performed by: PSYCHIATRY & NEUROLOGY

## 2020-07-05 PROCEDURE — 12800008 ZZH R&B CD ADULT

## 2020-07-05 RX ADMIN — BUPRENORPHINE AND NALOXONE 1 FILM: 8; 2 FILM, SOLUBLE BUCCAL; SUBLINGUAL at 20:29

## 2020-07-05 RX ADMIN — PHENOBARBITAL 32.4 MG: 32.4 TABLET ORAL at 10:20

## 2020-07-05 RX ADMIN — PHENOBARBITAL 32.4 MG: 32.4 TABLET ORAL at 20:29

## 2020-07-05 RX ADMIN — BUPRENORPHINE AND NALOXONE 1 FILM: 8; 2 FILM, SOLUBLE BUCCAL; SUBLINGUAL at 10:20

## 2020-07-05 RX ADMIN — NICOTINE 1 PATCH: 21 PATCH, EXTENDED RELEASE TRANSDERMAL at 10:20

## 2020-07-05 ASSESSMENT — ACTIVITIES OF DAILY LIVING (ADL)
ORAL_HYGIENE: INDEPENDENT
LAUNDRY: WITH SUPERVISION
HYGIENE/GROOMING: INDEPENDENT
DRESS: INDEPENDENT

## 2020-07-05 NOTE — PROGRESS NOTES
"Pt requesting to discharge today. \"Im ready to go, just call the doctor, he will let me leave.\" BP 96/64   Pulse 88   Temp 97.7  F (36.5  C) (Temporal)   Resp 16   Ht 1.753 m (5' 9\")   SpO2 99%  Encouraged patient to have breakfast and increase fluids as her BP is on the low end. Pt affect is flat/ irritable, she took her breakfast tray to her room.  She is not social with peers and has been mostly isolative to her room since admission. She is ambivalent about her discharge plan and will need to followup with CM. Discussed with Juan David Garcia and patient will not be discharged today, plan was for  tomorrow, phenobarbital taper starting today.  Pt informed and stated that she has to leave first thing in the morning and I explained that she will need to see Dr. Garcia in the morning and medications will need to ordered prior to discharge. Pt walked away. Will continue to monitor and assist with discharge planning as patient allows.   "

## 2020-07-06 VITALS
SYSTOLIC BLOOD PRESSURE: 105 MMHG | HEIGHT: 69 IN | OXYGEN SATURATION: 89 % | DIASTOLIC BLOOD PRESSURE: 74 MMHG | HEART RATE: 89 BPM | TEMPERATURE: 97.4 F | RESPIRATION RATE: 16 BRPM

## 2020-07-06 PROCEDURE — 25000132 ZZH RX MED GY IP 250 OP 250 PS 637: Performed by: PSYCHIATRY & NEUROLOGY

## 2020-07-06 PROCEDURE — 99239 HOSP IP/OBS DSCHRG MGMT >30: CPT | Mod: 95 | Performed by: PSYCHIATRY & NEUROLOGY

## 2020-07-06 RX ORDER — BUPRENORPHINE AND NALOXONE 8; 2 MG/1; MG/1
1 FILM, SOLUBLE BUCCAL; SUBLINGUAL 2 TIMES DAILY
Qty: 14 FILM | Refills: 0 | Status: SHIPPED | OUTPATIENT
Start: 2020-07-06

## 2020-07-06 RX ADMIN — PHENOBARBITAL 32.4 MG: 32.4 TABLET ORAL at 08:36

## 2020-07-06 RX ADMIN — BUPRENORPHINE AND NALOXONE 1 FILM: 8; 2 FILM, SOLUBLE BUCCAL; SUBLINGUAL at 08:36

## 2020-07-06 NOTE — DISCHARGE INSTRUCTIONS
"Behavioral Discharge Planning and Instructions  THANK YOU FOR CHOOSING THE [address]  [phone]    Summary: You were admitted to [facility] on 7/5/2020. for detoxification from Benzodiazepines.  A medical exam was performed that included lab work. You have met with a  and opted to pursue admission to [facility] after discharge.  Please take care and make your recovery a priority!     Recommendation: Enter and complete a residential treatment program such as [facility] and follow all continuing care recommendations.      Main Diagnosis:  Per. Dr. Garcia  Opiate use disorder on Suboxone   Benzodiazepine use disorder       Major Treatments, Procedures and Findings:   You received treatment for Benzodiazepine withdrawal.  You have met with a  to develop a treatment plan for discharge.  You have had labs drawn and a copy of those labs will be sent home with you.  Please bring your lab results with you to your follow up doctors appointment.  Make your recovery a priority.     Symptoms to Report:  If you experience more anxiety, confusion, sleeplessness, deep sadness or thoughts of suicide, notify your treatment team or notify your primary care physician. IF ANY OF THE SYMPTOMS YOU ARE EXPERIENCING ARE A MEDICAL EMERGENCY CALL 911 IMMEDIATELY.     Lifestyle Adjustment: Adjust your lifestyle to get enough sleep, relaxation, exercise and  good nutrition. Continue to develop healthy coping skills to decrease stress and promote a sober living environment. Do not use alcohol, illegal drugs or addictive medications other than what is currently prescribed. AA, NA, and  Sponsor are excellent resources for support.     Disposition: Home, you endorse a plan to follow up with JOE Rodas on your own.     [facility]  Address: [address] [place] [address].  Phone: [phone]   About: \"[facility] is designed to meet the needs of women who " "have substance use disorders with the mission of rehabilitation. The principal aim is a global change in lifestyle. We offer behavior-based empowerment programs where women strive towards lifestyles free from substance use disorders.\"     Suboxone Maintenance Provider:  [facility]   Address: [address], [place] [address]   Phone: [phone]    Appointment: You report that you will schedule an appointment after discharge      [facility]  Address: [address], [place] [address]  Phone: [phone] or [phone]  Fax: [phone]  You report you had a Rule 25 assessment at [facility] within the last 45 days and opted not to complete a Rule 25 assessment while on unit 3A, or wait for case management to obtain your original Rule 25 to complete an update. You will need to follow up with [facility] for any further case management needs involving your assessment and referral to treatment.     DISCHARGE RESOURCES:  -SMART Recovery - self management for addiction recovery:  www.smartrecovery.org    -[facility] ~ [facility] Crisis [facility] & Support [facility]: 650.383.6173.  -[facility] [phone]   -[facility] Behavioral Intake 689-502-3837 or 322-076-9316.  -Crisis Intervention: 300.302.7497 or 237-264-5152 (TTY: 327.736.2771).  Call anytime.  -Suicide Awareness Voices of Education (SAVE) (www.save.org): 937-441-NWYQ (5896)  -National Suicide Prevention Line (www.mentalhealthmn.org): 871-476-BQAX (0237)  -National [place] on Mental Illness (www.[place].dequan.org): 603.851.9342 or 997-974-6673.  -Szmk9ccuy: text the word LIFE to 69939 for immediate support and crisis intervention  -Mental Health Consumer/Survivor Network of [place] (www.mhcsn.net): 755.669.7706 or 388-259-4433  -Mental Health Association of [place] (www.mentalhealth.org): 782.738.9533 or 876-654-2089     -Substance Abuse and Mental Health " Services (www.sama.gov)  -Harm Reduction Coalition (www. Harmreduction.org)  -www.prescribetoprevent.org or http://prescribetoprevent.org/video  -Poison control 1-284.456.6192     Sober Support Group Information:  AA/NA & Sponsor/Support  -Alcoholics Anonymous (www.alcoholics-anonymous.org): for local information 24 hours/day  -AA Intergroup service office in [place] (http://www.aastpaul.org/) 164.702.7963  -AA Intergroup service office in [facility]: 642.281.9290. (http://www.a[place].org/)  -Narcotics Anonymous (www.nam[place].org) (683) 913-2715   **Sober Fun Activities: www.sober-activities.Convertro/[facility]//[place]    General Medication Instructions:   See your medication sheet(s) for instructions.   Take all medicines as directed.  Make no changes unless your doctor suggests them.   Go to all your doctor visits.  Be sure to have all your required lab tests. This way, your medicines can be refilled on time.  Do not use any drugs not prescribed by your provider.  AA/NA and Sponsors are excellent resources for support  Avoid alcohol.    Please Note:  If you have any questions at anytime after you are discharged please call the [facility] [place] detox unit 3AW unit at [phone].    [facility], Behavioral Intake [phone]    Please take this discharge folder with you to all your follow up appointments, it contains your lab results, diagnosis, medication list and discharge recommendations.      THANK YOU FOR CHOOSING THE [facility]

## 2020-07-06 NOTE — DISCHARGE SUMMARY
"Psychiatric Discharge Summary    Brenda Balelsteros MRN# [id number]   Age: 44 year old YOB: 1975     Date of Admission:  7/3/2020  Date of Discharge:  7/6/2020 10:10 AM  Admitting Physician:  Thierry Garcia MD  Discharge Physician:  Thierry Garcia MD          Event Leading to Hospitalization:   The patient is a 43yo female with a history of benzodiazepine use disorder and opiate use disorder on Suboxone maintenance who was admitted for benzodiazepine withdrawal. Says that she is \"upset with the way they handled my intake.\" Says that staff went through her belongings without her present. Did sign a 72-hour intent to leave but is willing to stay through the weekend to detoxify. Says that she is going to do CD treatment at [place]. Asks about using her own shampoo and conditioner. Mood is \"upset.\" Denies SI or HI. Sleeping well.         See Admission note by Thierry Garcia MD for additional details.          Diagnoses:     Benzodiazepine dependence (H)  Opiate use disorder         Labs:        Lab Results   Component Value Date     07/03/2020    Lab Results   Component Value Date    CHLORIDE 108 07/03/2020    Lab Results   Component Value Date    BUN 21 07/03/2020      Lab Results   Component Value Date    POTASSIUM 4.5 07/03/2020    Lab Results   Component Value Date    CO2 26 07/03/2020    Lab Results   Component Value Date    CR 0.76 07/03/2020          Lab Results   Component Value Date    WBC 4.3 07/03/2020    HGB 12.7 07/03/2020    HCT 38.6 07/03/2020    MCV 89 07/03/2020     07/03/2020     Lab Results   Component Value Date    AST 19 07/03/2020    ALT 25 07/03/2020    ALKPHOS 71 07/03/2020    BILITOTAL 0.3 07/03/2020     No results found for: TSH         Consults:   Consultation during this admission received from internal medicine.  No medical intervention was indicated.           Hospital Course:   Brenda Ballesteros was admitted to Station 3A with attending Thierry " Shamar Garcia MD as a voluntary patient. The patient was placed under status 15 (15 minute checks) to ensure patient safety.   CBC, BMP and utox obtained.    All outpatient medications were continued. The patient was detoxified from opiates using Subutex which was transitioned to Suboxone at discharge. The patient was detoxified from benzodiazepines using a phenobarbital taper.      Brenda Ballesteros did participate in groups and was visible in the milieu.     The patient's symptoms of withdrawal improved.     Brenda Ballesteros was released to  treatment. At the time of discharge Brenda Ballesteros was determined to not be a danger to herself or others. At the current time of discharge, the patient does not meet criteria for involuntary hospitalization. On the day of discharge, the patient reports that they do not have suicidal or homicidal ideation and would never hurt themselves or others. Steps taken to minimize risk include: assessing patient s behavior and thought process daily during hospital stay, discharging patient with adequate plan for follow up for mental and physical health and discussing safety plan of returning to the hospital should the patient ever have thoughts of harming themselves or others. Therefore, based on all available evidence including the factors cited above, the patient does not appear to be at imminent risk for self-harm, and is appropriate for outpatient level of care.           Discharge Medications:     Current Discharge Medication List      CONTINUE these medications which have CHANGED    Details   buprenorphine HCl-naloxone HCl (SUBOXONE) 8-2 MG per film Place 1 Film under the tongue 2 times daily  Qty: 14 Film, Refills: 0    Comments: TIKA: [id number]  Associated Diagnoses: Polysubstance abuse (H)         CONTINUE these medications which have NOT CHANGED    Details   Ascorbic Acid (VITAMIN C) 500 MG CHEW Take 500 mg by mouth daily          STOP taking these medications        dextroamphetamine (DEXEDRINE SPANSULE) 15 MG 24 hr capsule Comments:   Reason for Stopping:         dextroamphetamine (DEXTROSTAT) 10 MG tablet Comments:   Reason for Stopping:         ondansetron (ZOFRAN-ODT) 8 MG ODT tab Comments:   Reason for Stopping:         zolpidem (AMBIEN) 10 MG tablet Comments:   Reason for Stopping:                    Psychiatric Examination:   Appearance:  awake, alert and adequately groomed  Attitude:  cooperative  Eye Contact:  good  Mood:  good  Affect:  mood congruent  Speech:  clear, coherent  Psychomotor Behavior:  no evidence of tardive dyskinesia, dystonia, or tics  Thought Process:  goal oriented  Associations:  no loose associations  Thought Content:  no evidence of suicidal ideation or homicidal ideation and no evidence of psychotic thought  Insight:  fair  Judgment:  intact  Oriented to:  time, person, and place  Attention Span and Concentration:  intact  Recent and Remote Memory:  fair  Language: Able to read and write  Fund of Knowledge: appropriate  Muscle Strength and Tone: normal  Gait and Station: Normal         Discharge Plan:   Continue medications as above.     Major Treatments, Procedures and Findings:   You received treatment for Benzodiazepine withdrawal.  You have met with a  to develop a treatment plan for discharge.  You have had labs drawn and a copy of those labs will be sent home with you.  Please bring your lab results with you to your follow up doctors appointment.  Make your recovery a priority.      Symptoms to Report:  If you experience more anxiety, confusion, sleeplessness, deep sadness or thoughts of suicide, notify your treatment team or notify your primary care physician. IF ANY OF THE SYMPTOMS YOU ARE EXPERIENCING ARE A MEDICAL EMERGENCY CALL 911 IMMEDIATELY.      Lifestyle Adjustment: Adjust your lifestyle to get enough sleep, relaxation, exercise and  good nutrition. Continue to develop healthy coping skills to decrease stress and  "promote a sober living environment. Do not use alcohol, illegal drugs or addictive medications other than what is currently prescribed. AA, NA, and  Sponsor are excellent resources for support.      Disposition: Home, you endorse a plan to follow up with [facility] on your own.      [facility]  Address: [address] [place] [address].  Phone: [phone]   About: \"[facility] is designed to meet the needs of women who have substance use disorders with the mission of rehabilitation. The principal aim is a global change in lifestyle. We offer behavior-based empowerment programs where women strive towards lifestyles free from substance use disorders.\"      Suboxone Maintenance Provider:  [facility]     Address: [address], [place] [address]     Phone: [phone]     Appointment: You report that you will schedule an appointment after discharge       [facility]  Address: [address] [place] [address]  Phone: [phone] or [phone]  Fax: [phone]  You report you had a Rule 25 assessment at [facility] within the last 45 days and opted not to complete a Rule 25 assessment while on unit 3A, or wait for case management to obtain your original Rule 25 to complete an update. You will need to follow up with [facility] for any further case management needs involving your assessment and referral to treatment.      DISCHARGE RESOURCES:  -SMART Recovery - self management for addiction recovery:  www.smartrecovery.org    -[facility] ~ A [facility]: 638.374.3370.  -[facility] [phone]   -[facility] Behavioral Intake [phone] or [phone].  -Crisis Intervention: [phone] or [phone] (TTY: 572.647.1954).  Call anytime.  -Suicide Awareness Voices of Education (SAVE) (www.save.org): 630-704-SAVE " (9432)  -National Suicide Prevention Line (www.mentalhealthmn.org): 042-613-PSLF (8807)  -National [place] on Mental Illness (www.mn.dequan.org): 272.210.8337 or 939-292-4367.  -Yzdq8ejim: text the word LIFE to 08985 for immediate support and crisis intervention  -Mental Health Consumer/Survivor Network of [place] (www.mhcsn.net): 551.387.5968 or 252-237-0063  -Mental Health Association of [place] (www.mentalhealth.org): 543.246.5008 or 452-299-4997     -Substance Abuse and Mental Health Services (www.samhsa.gov)  -Harm Reduction Coalition (www. Harmreduction.org)  -www.prescribetoprevent.org or http://prescribetoprevent.org/video  -Poison control 2-148-433-1749     Attestation:    Telemedicine Visit: The patient's condition can be safely assessed and treated via synchronous audio and visual telemedicine encounter.      Start Time: 0942  Stop Time: 0946    Reason for Telemedicine Visit: Covid-19    Originating Site (Patient Location): [facility]    Distant Site (Provider Location): Provider home office    Consent:  The patient/guardian has verbally consented to: the potential risks and benefits of telemedicine (video visit) versus in person care; bill my insurance or make self-payment for services provided; and responsibility for payment of non-covered services.     Mode of Communication:  Video Conference via [employer]    As the provider I attest to compliance with applicable laws and regulations related to telemedicine.     The patient was seen and evaluated by me. I spent more than 30 minutes on discharge day activities. Thierry Garcia MD

## 2020-07-06 NOTE — PLAN OF CARE
Behavioral Team Discussion: (7/6/2020)    Continued Stay Criteria/Rationale: Patient admitted for benzodiazepine withdrawal, complicated.  Plan: The following services will be provided to the patient; psychiatric assessment, medication management, therapeutic milieu, individual and group support, and skills groups.   Participants: 3A Provider: Dr. Thierry Garcia MD; 3A RN's: Win Taylor RN; 3A CM's: Maki Moreira [facility]  Summary/Recommendation: Providers will assess today for treatment recommendations, discharge planning, and aftercare plans. CM will meet with pt for discharge planning.   Medical/Physical: Internal medicine consult completed 7/4/2020.  Precautions:   Behavioral Orders   Procedures     Code 1 - Restrict to Unit     Routine Programming     As clinically indicated     Status 15     Every 15 minutes.     Withdrawal precautions     Rationale for change in precautions or plan: N/A  Progress: Improving.

## 2020-07-06 NOTE — PROGRESS NOTES
PDMP as of 7/6/2020:     Brenda Favian     Risk Indicators   NARX SCORES   Narcotic   350   Sedative   270   Stimulant   180     OVERDOSE RISK SCORE   450   (Range 000-999)     ADDITIONAL RISK INDICATORS ( 0 )     This NarxCare report is based on search criteria supplied and the data entered by the dispensing pharmacy. For more information about any prescription, please contact the dispensing pharmacy or the prescriber. NarxCare scores and reports are intended to aid, not replace, medical decision making. None of the information presented should be used as sole justification for providing or refusing to provide medications. The information on this report is not warranted as accurate or complete.   Graphs   RX GRAPH   Narcotic Buprenorphine Sedative Stimulant Other   All PrescribersPrescribers2 - Sage Kay1 - Sage Ordonez [id number]     Buprenorphine mg   [id number]   Morphine MgEq (MME)   [id number]   Lorazepam MgEq (LME)   [id number]/[id number]   *Per CDC guidance, the MME conversion factors prescribed or provided as part of the medication-assisted treatment for opioid use disorder should not be used to benchmark against dosage thresholds meant for opioids prescribed for pain. Buprenorphine products have no agreed upon morphine equivalency, and as partial opioid agonists, are not expected to be associated with overdose risk in the same dose-dependent manner as doses for full agonist opioids. MME = morphine milligram equivalents. LME = Lorazepam milligram equivalents. mg = dose in milligrams.   Summary   Summary  Total Prescriptions: 36   Total Prescribers: 2   Total Pharmacies: 3   Narcotics* (excluding Buprenorphine)  Current Qty: 0   Current MME/day: 0.00   30 Day Avg MME/day: 0.00   Sedatives*  Current Qty: 0   Current LME/day: 0.00   30 Day Avg LME/day: 0.00   Buprenorphine*  Current Qty: 0   Current mg/day: 0.00   30 Day Avg mg/day: 0.00   Rx  Data   PRESCRIPTIONS   Total Prescriptions: 36   Total Private Pay: 0     Fill Date ID Written Drug Qty Days Prescriber Rx # Pharmacy Refill Daily Dose * Pymt Type    04/16/2020  3  04/16/2020  Zolpidem Tartrate 10 Mg Tablet    30.00 30 Pa Ekb  [id number]  Gen (1947)  0/0 0.50 [place]   04/16/2020  3  02/18/2020  Dextroamphetamine 10 Mg Tab    30.00 30 Pa Ekb  76530  Gen (1947[place]   04/16/2020  3  02/18/2020  Dextroamphetamine Er 15 Mg Cap    60.00 30 Pa Ekb  54067  Gen (1947[place]   03/16/2020  2  02/18/2020  Dextroamphetamine Er 15 Mg Cap    60.00 30 Pa Ekb  15144  Gen (1947[place]   03/16/2020  2  02/18/2020  Dextroamphetamine 10 Mg Tab    30.00 30 Pa Ekb  53747  Gen (1947[place]   03/16/2020  2  10/09/2019  Zolpidem Tartrate 10 Mg Tablet    30.00 30 Pa Ekb  99305  Gen (1947)  5/5 0.50 [place]   02/18/2020  2  02/18/2020  Dextroamphetamine 10 Mg Tab    30.00 30 Pa Ekb  38855  Gen (1947[place]   02/18/2020  2  02/18/2020  Dextroamphetamine Er 15 Mg Cap    60.00 30 Pa Ekb  63933  Gen (1947[place]   02/18/2020  2  02/18/2020  Suboxone 8 Mg-2 Mg Sl Film    60.00 30 Pa Ekb  07901  Gen (1947)  0/0 16.[place]   02/17/2020  2  10/09/2019  Zolpidem Tartrate 10 Mg Tablet    30.00 30 Pa Ekb  30835  Gen (1947)  4/5 0.50 [place]   01/14/2020  2  10/09/2019  Zolpidem Tartrate 10 Mg Tablet    30.00 30 Pa Ekb  47081  Gen (1947)  3/5 0.50 [place]   01/06/2020  4  11/29/2019  Dextroamphetamine Er 15 Mg Cap    56.00 28 Pa Ekb  [id number]  Hy- (8774)  [place]   01/04/2020  4  11/29/2019  Dextroamphetamine Er 15 Mg Cap    4.00 2 Pa Ekb  [id number]  Hy- (8774)  [place]   01/04/2020  1  11/29/2019  Dextroamphetamine 10 Mg Tab    30.00 30 Pa Ekb  [id number]  Wal (6827[place]  [place]   12/16/2019  2  10/09/2019  Zolpidem Tartrate 10 Mg Tablet    30.00 30 Pa Ekb  21807  Gen (1947)  2/5 0.50 [place]   12/06/2019  1  12/06/2019  Dextroamphetamine Er 15 Mg Cap    60.00 30 Pa Ekb  [id number]  Wal (6827)  [place]   12/06/2019  1  12/06/2019  Suboxone 8 Mg-2 Mg Sl Film    60.00 30 Pa Ekb  [id number]  Wal (6827)  0/0 16.[place]   12/06/2019  1  12/06/2019  Dextroamphetamine 10 Mg Tab    30.00 30 Pa Ekb  [id number]  Wal (6827)  [place]   11/06/2019  2  10/09/2019  Dextroamphetamine Er 15 Mg Cap    60.00 30 Pa Ekb  28518  Gen (1947[place]  [place]   11/06/2019  2  10/09/2019  Dextroamphetamine 10 Mg Tab    30.00 30 Pa Ekb  72640  Gen (1947[place]   11/06/2019  2  10/09/2019  Zolpidem Tartrate 10 Mg Tablet    30.00 30 Pa Ekb  89785  Gen (1947)  1/5 0.50 [place]   10/15/2019  2  10/09/2019  Suboxone 8 Mg-2 Mg Sl Film    60.00 30 Pa Ekb  19351  Gen (1947)  0/0 16.[place]   10/09/2019  2  10/09/2019  Zolpidem Tartrate 10 Mg Tablet    30.00 30 Pa Ekb  77212  Gen (1947)  0/5 0.50 [place]   10/08/2019  2  09/18/2019  Dextroamphetamine 10 Mg Tab    30.00 30 Pa Ekb  84830  Gen (1947[place]   10/08/2019  2  09/18/2019  Dextroamphetamine Er 15 Mg Cap    60.00 30 Pa Ekb  16415  Gen (1947[place]   09/18/2019  2  09/18/2019  Suboxone 8 Mg-2 Mg Sl Film    60.00 30 Pa Ekb  05669  Gen (1947)  0/0 16.[place]   09/09/2019  2  07/08/2019  Dextroamphetamine Er 15 Mg Cap    60.00 30 Pa Ekb  01202  Gen (1947[place]   09/09/2019  2  07/08/2019  Dextroamphetamine 10 Mg Tab    30.00 30 Pa Ekb  06118  Gen (1947[place]   08/29/2019  2  04/04/2019  Zolpidem Tartrate 10 Mg Tablet    30.00 30 Pa Ekb  4387  Gen (1947)  5/5 0.50 [place]   08/07/2019  2  08/07/2019  Suboxone 8 Mg-2 Mg Sl Film    60.00 30 Pa Ekb  8791  Gen (1947)  0/0 16.[place]   08/07/2019  2  08/07/2019  Dextroamphetamine 10 Mg Tab    30.00 30 Pa Ekb  8789  Gen (1947[place]  0/[place]   08/07/2019  2  08/07/2019   Dextroamphetamine Er 15 Mg Cap    60.00 30 Pa Ekb  8788  Gen (1947[place]  0/[place]   07/31/2019  2  04/04/2019  Zolpidem Tartrate 10 Mg Tablet    30.00 30 Pa Ekb  4387  Gen (1947)  4/5 0.50 [place]   07/08/2019  2  07/08/2019  Suboxone 8 Mg-2 Mg Sl Film    60.00 30 Pa Ekb  7674  Gen (1947)  0/0 16.[place]   07/08/2019  2  07/08/2019  Dextroamphetamine Er 15 Mg Cap    60.00 30 Pa Ekb  7673  Gen (1947[place]  0/[place]   07/08/2019  2  07/08/2019  Dextroamphetamine 10 Mg Tab    30.00 30 Pa Ekb  7672  Gen (1947)  0/[place]   *Per CDC guidance, the MME conversion factors prescribed or provided as part of the medication-assisted treatment for opioid use disorder should not be used to benchmark against dosage thresholds meant for opioids prescribed for pain. Buprenorphine products have no agreed upon morphine equivalency, and as partial opioid agonists, are not expected to be associated with overdose risk in the same dose-dependent manner as doses for full agonist opioids. MME = morphine milligram equivalents. LME = Lorazepam milligram equivalents. mg = dose in milligrams.   Providers  Total Providers: 2   Name  Address  City  State  Zipcode  Phone    Sage Sung, Do [address] [facility] [address]  [facility] [address] [phone]   Sage Sung, Do [address] [phone]   Pharmacies  Total Pharmacies: 3   Name  Address  City  State  Zipcode  Phone    [employer] [phone] [facility]  [place] [address] [phone]   [employer] [phone] [facility] [address]  [facility] [address] [phone]   [employer] [phone] [facility] [address] [phone]     The report provided is based upon the search criteria entered and the corresponding data as it has been reported by dispenser(s). If erroneous information is identified or additional information is needed, please contact the dispenser or the  prescriber provided on the report. Note, federal regulation (CFR Title 42: Part 2) prevents federally funded opioid treatment programs (OTPs) from releasing certain patient data. As such, controlled substances dispensed from OTPs for medication-assisted treatment will not appear in the [place]  report. Morphine milligram equivalent (MME) conversion factors published by the CDC are used in the MME calculation. Per the CDC, the MME conversion factor is intended only for analytic purposes where prescription data are used to retrospectively calculate daily MME to inform analyses of risks associated with opioid prescribing. This value does not constitute clinical guidance or recommendations for converting patients from one form of opioid analgesic to another. Per the CDC, the conversion factors for drugs prescribed or provided, as part of medication-assisted treatment for opioid use disorder should not be used to benchmark against MME dosage thresholds meant for opioids prescribed for pain. Buprenorphine products listed in the CDC s MME file do not have an associated conversion factor. Lastly, the CDC notes, in clinical practice, calculating MME for methadone often involves a sliding-scale approach, whereby the conversion factor increases with increasing dose. The conversion factor of 3 for methadone presented in this file could underestimate MME for a given patient. This report contains confidential information, including patient identifiers, and is not a public record. The information on this report must be treated as protected health information and is only to be disclosed to others as authorized by applicable state and Federal regulations.   Powered By     [place] Prescription Monitoring Program  [place] Board of Pharmacy  [address] [place] [address] [address]  [place] [address]  9 [phone]     [employer] 2020. All Rights Reserved. Privacy Policy

## 2020-07-06 NOTE — PROGRESS NOTES
Patient discharged to home. Patient escorted off the unit by [facility] MASTERS.        All patient belongings from room, medications (brought in from home), locked bin and security were sent with patient.  Discharge medications sent to home pharmacy.  This RN went over discharge instructions, teachings, patient's labs,medications and discharge recommendations.  Patient verbalizes and demonstrates understanding of all teachings. Patient denies thoughts of harm towards self or others. Pt denies any current medical, has no further questions and has been discharge at this time.

## 2020-07-06 NOTE — PROGRESS NOTES
"Pt up early this am stating that she has to leave by 10 am as she has a scheduled visit with her 6 your daughter. She is irritable, denies all benzodiazepine withdrawal sx, denies depression or SI.  She declines to set up a f/u appointment with [facility] stating that Dr. Garcia told her he would send her with 6 days worth of suboxone and she will do this later.  She also states that she does not want to talk with CM as she had a CD assessment and has been referred to [facility]. \"I'm not stupid I can figure that out on my own.\" She wants her medications sent to her outpatient pharmacy, \"Im not waiting for medications to get it here I have to go.\" Pt will be seen by Dr. Garcia this am and is hoping for immediate discharge..  "

## 2020-07-06 NOTE — PLAN OF CARE
Patient isolative to room much of the shift. Patient affect flat, mood is irritable. Patient denies SI, SIB, HI, or hallucinations. Patient refused vital signs at 1600, did agree to vital signs and MSSA assessment at 2000. Patient MSSA 7. Patient medication compliant. Ate dinner and snack in her room. Denies any additional concerns.

## 2020-09-08 ENCOUNTER — ANCILLARY PROCEDURE (OUTPATIENT)
Dept: GENERAL RADIOLOGY | Facility: CLINIC | Age: 45
End: 2020-09-08
Attending: FAMILY MEDICINE
Payer: COMMERCIAL

## 2020-09-08 ENCOUNTER — OFFICE VISIT (OUTPATIENT)
Dept: FAMILY MEDICINE | Facility: CLINIC | Age: 45
End: 2020-09-08
Payer: COMMERCIAL

## 2020-09-08 VITALS
BODY MASS INDEX: 26.81 KG/M2 | RESPIRATION RATE: 18 BRPM | OXYGEN SATURATION: 98 % | DIASTOLIC BLOOD PRESSURE: 74 MMHG | WEIGHT: 181 LBS | HEIGHT: 69 IN | HEART RATE: 96 BPM | TEMPERATURE: 97.4 F | SYSTOLIC BLOOD PRESSURE: 110 MMHG

## 2020-09-08 DIAGNOSIS — M79.672 LEFT FOOT PAIN: ICD-10-CM

## 2020-09-08 DIAGNOSIS — M25.572 PAIN IN JOINT INVOLVING ANKLE AND FOOT, LEFT: ICD-10-CM

## 2020-09-08 DIAGNOSIS — M25.572 PAIN IN JOINT INVOLVING ANKLE AND FOOT, LEFT: Primary | ICD-10-CM

## 2020-09-08 PROCEDURE — 73610 X-RAY EXAM OF ANKLE: CPT | Mod: LT

## 2020-09-08 PROCEDURE — 99203 OFFICE O/P NEW LOW 30 MIN: CPT | Performed by: FAMILY MEDICINE

## 2020-09-08 PROCEDURE — 73630 X-RAY EXAM OF FOOT: CPT | Mod: LT

## 2020-09-08 RX ORDER — IBUPROFEN 600 MG/1
600 TABLET, FILM COATED ORAL EVERY 6 HOURS PRN
Qty: 30 TABLET | Refills: 0 | Status: SHIPPED | OUTPATIENT
Start: 2020-09-08

## 2020-09-08 RX ORDER — DEXTROAMPHETAMINE SULFATE 10 MG/1
40 TABLET ORAL DAILY
COMMUNITY

## 2020-09-08 ASSESSMENT — PAIN SCALES - GENERAL: PAINLEVEL: MODERATE PAIN (5)

## 2020-09-08 ASSESSMENT — MIFFLIN-ST. JEOR: SCORE: 1545.39

## 2020-09-08 NOTE — PROGRESS NOTES
"Subjective     Brenda Ballesteros is a 44 year old female who presents to clinic today for the following health issues:    HPI       Musculoskeletal problem/pain  Onset/Duration: yesterday  Description  Location: foot - left ankle  Joint Swelling: YES  Redness: no  Pain: YES-better Today  Warmth: NO  Intensity:  moderate  Progression of Symptoms:  worsening  Accompanying signs and symptoms:   Fevers:NO  Numbness/tingling/weakness: numbness  History  Trauma to the area: YES tripped on the stairs   Recent illness:  no  Previous similar problem: no  Previous evaluation:  no  Precipitating or alleviating factors:  Aggravating factors include: standing, walking, climbing stairs, lifting, exercise and overuse  Therapies tried and outcome: ice      Review of Systems   Rest of the ROS is Negative except see above and Problem list [stable]        Objective    /74   Pulse 96   Temp 97.4  F (36.3  C) (Oral)   Resp 18   Ht 1.753 m (5' 9\")   Wt 82.1 kg (181 lb)   SpO2 98%   BMI 26.73 kg/m    Body mass index is 26.73 kg/m .  Physical Exam   GENERAL: healthy, alert and no distress  Left ankle swelling Lateral malleolus  ROM is has some pain  Mild tenderness Dorsal aspect foot  No swelling  CMS is Intact    Xray - reviewed         Assessment & Plan     Brenda was seen today for musculoskeletal problem.    Diagnoses and all orders for this visit:    Pain in joint involving ankle and foot, left  -     XR Ankle Left G/E 3 Views; Future  -     ibuprofen (ADVIL/MOTRIN) 600 MG tablet; Take 1 tablet (600 mg) by mouth every 6 hours as needed for moderate pain    Left foot pain  -     XR Foot Left G/E 3 Views; Future  -     ibuprofen (ADVIL/MOTRIN) 600 MG tablet; Take 1 tablet (600 mg) by mouth every 6 hours as needed for moderate pain       advised elevate  Ice  SEE EPIC care orders  The potential side effects of this medication have been discussed with the patient.  Call if any significant problems with these are " experienced.  Ace wrap  Follow up 1 week if not betterTobacco Cessation:   reports that she has been smoking. She has been smoking about 0.50 packs per day. She does not have any smokeless tobacco history on file.    Advised flu shot and benefits of getting it.      Return in about 1 week (around 9/15/2020) for recheck, Physical Exam.    Jazmin Nicole MD  [facility]

## 2020-09-08 NOTE — LETTER
[facility]  [address] [address]  [phone]          September 8, 2020    RE:  Brenda Ballesteros                                                                                                                                                       [address]  [place] [address]            To whom it may concern:    Brenda Ballesteros is under my professional care and  unable to go to her Group meeting today due to medical issues.  Sincerely,        Jazmin Nicole MD

## 2021-05-25 ENCOUNTER — RECORDS - HEALTHEAST (OUTPATIENT)
Dept: ADMINISTRATIVE | Facility: CLINIC | Age: 46
End: 2021-05-25

## 2021-05-30 ENCOUNTER — RECORDS - HEALTHEAST (OUTPATIENT)
Dept: ADMINISTRATIVE | Facility: CLINIC | Age: 46
End: 2021-05-30

## 2021-06-01 ENCOUNTER — RECORDS - HEALTHEAST (OUTPATIENT)
Dept: ADMINISTRATIVE | Facility: CLINIC | Age: 46
End: 2021-06-01

## 2021-09-17 ENCOUNTER — OFFICE VISIT (OUTPATIENT)
Dept: URGENT CARE | Facility: URGENT CARE | Age: 46
End: 2021-09-17
Payer: COMMERCIAL

## 2021-09-17 VITALS
TEMPERATURE: 97.5 F | BODY MASS INDEX: 28.21 KG/M2 | HEART RATE: 77 BPM | SYSTOLIC BLOOD PRESSURE: 121 MMHG | DIASTOLIC BLOOD PRESSURE: 82 MMHG | WEIGHT: 191 LBS | OXYGEN SATURATION: 100 %

## 2021-09-17 DIAGNOSIS — Z20.822 EXPOSURE TO COVID-19 VIRUS: Primary | ICD-10-CM

## 2021-09-17 PROCEDURE — U0005 INFEC AGEN DETEC AMPLI PROBE: HCPCS | Performed by: STUDENT IN AN ORGANIZED HEALTH CARE EDUCATION/TRAINING PROGRAM

## 2021-09-17 PROCEDURE — U0003 INFECTIOUS AGENT DETECTION BY NUCLEIC ACID (DNA OR RNA); SEVERE ACUTE RESPIRATORY SYNDROME CORONAVIRUS 2 (SARS-COV-2) (CORONAVIRUS DISEASE [COVID-19]), AMPLIFIED PROBE TECHNIQUE, MAKING USE OF HIGH THROUGHPUT TECHNOLOGIES AS DESCRIBED BY CMS-2020-01-R: HCPCS | Performed by: STUDENT IN AN ORGANIZED HEALTH CARE EDUCATION/TRAINING PROGRAM

## 2021-09-17 PROCEDURE — 99213 OFFICE O/P EST LOW 20 MIN: CPT | Performed by: STUDENT IN AN ORGANIZED HEALTH CARE EDUCATION/TRAINING PROGRAM

## 2021-09-18 NOTE — PROGRESS NOTES
Assessment & Plan     Exposure to COVID-19 virus  - Asymptomatic COVID-19 Virus (Coronavirus) by PCR Nose         Return if symptoms worsen or fail to improve.    Luma Drummond MD  [facility] MARIA EUGENIA Gutierrez is a 45 year old female who presents to clinic today for the following health issues:  Chief Complaint   Patient presents with     Urgent Care     Covid 19 Testing     HPI  Decided not to get vaccinated against COVID19  Wants to visit parents and needs negative COVID test  No symptoms: no fevers, SOB, chest pain, change in taste/smell        Objective    /82 (BP Location: Right arm, Patient Position: Sitting, Cuff Size: Adult Large)   Pulse 77   Temp 97.5  F (36.4  C) (Tympanic)   Wt 86.6 kg (191 lb)   SpO2 100%   Breastfeeding No   BMI 28.21 kg/m    Physical Exam   GEN: Alert and appropriately interactive for age  EYES: Eyes grossly normal to inspection, conjunctivae and sclerae normal  RESP: Breathing comfortably on room air   CV: Warm and well perfused peripheral extremities   MS: no gross musculoskeletal defects noted, no edema  NEURO:Gait normal. Speech fluent.    PSYCH:  Appearance well groomed.

## 2021-09-18 NOTE — PATIENT INSTRUCTIONS
"Patient Education   Getting COVID-19 Test Results in [employer]  [employer] is often the fastest way to get your test results.  [facility] is a hospital system that used to be 2 different hospital systems. The [facility] system was the first. The [facility] system was the second. The [facility] hospitals and the [facility] hospitals still use their own MyChart systems.   In the future, there will be only a single MyChart system. But for now, you may need to take 1 extra step to get your test results in [employer].  Which MyChart account do I need?    If you got your test at a [facility] site:   You'll need a [facility] MyChart account.  1. Go to [email or web address].  2. If you also have a [facility] MyChart account, you'll need to link the 2 accounts. See \"Linking MyChart accounts\" below.    If you got your test at a [facility] site:   You'll need a [facility] [employer] account.  1. Go to [email or web address].  2. If you also have a [facility] MyChart account, you'll need to link the 2 accounts. See \"Linking MyChart accounts\" below.  Still not sure which account you need? Call our [employer] Patient Support Line at [phone] or [phone].  Linking [employer] accounts  If you're using the [employer] mobile kris:  1. Log into [employer].  2. Click the 3 dots in the top right corner.  3. Click \"Account Settings.\"  4. Scroll down. Click \"Link My Accounts.\"  5. Click the account to link.  If you're using an Internet browser (like [employer], [employer], Firefox or [employer]ubimetrics):  1. Log into the main [employer] account.  2. At the top of the screen, hover the cursor over the Profile tab.  3. Click \"Link My Accounts.\"    4. Click the account you want to link ([facility] or [facility]).   Note:If you don't see the account listed, you can search for it: Type the account name in the \"Discover Accounts to Link\" field.  5. Click the \"Link Account\" button.    For informational purposes only. Not to replace the advice of " your health care provider.   Copyright   2020 [facility]. All rights reserved. [employer] [id number] - 08/20.

## 2021-09-19 LAB — SARS-COV-2 RNA RESP QL NAA+PROBE: NEGATIVE

## 2022-04-04 ENCOUNTER — TELEPHONE (OUTPATIENT)
Dept: BEHAVIORAL HEALTH | Facility: CLINIC | Age: 47
End: 2022-04-04
Payer: COMMERCIAL

## 2022-04-06 ENCOUNTER — HOSPITAL ENCOUNTER (OUTPATIENT)
Dept: BEHAVIORAL HEALTH | Facility: CLINIC | Age: 47
Discharge: HOME | End: 2022-04-06
Attending: FAMILY MEDICINE
Payer: COMMERCIAL

## 2022-04-06 PROCEDURE — 999N000216 HC STATISTIC ADULT CD FACE TO FACE-NO CHRG: Mod: GT,95 | Performed by: PSYCHOLOGIST
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
## 2022-04-10 ENCOUNTER — HEALTH MAINTENANCE LETTER (OUTPATIENT)
Age: 47
End: 2022-04-10

## 2022-10-16 ENCOUNTER — HEALTH MAINTENANCE LETTER (OUTPATIENT)
Age: 47
End: 2022-10-16

## 2022-12-03 ENCOUNTER — HEALTH MAINTENANCE LETTER (OUTPATIENT)
Age: 47
End: 2022-12-03

## 2023-02-08 ENCOUNTER — OFFICE VISIT (OUTPATIENT)
Dept: URGENT CARE | Facility: URGENT CARE | Age: 48
End: 2023-02-08
Payer: COMMERCIAL

## 2023-02-08 VITALS
TEMPERATURE: 98.7 F | HEART RATE: 95 BPM | OXYGEN SATURATION: 100 % | SYSTOLIC BLOOD PRESSURE: 142 MMHG | RESPIRATION RATE: 12 BRPM | DIASTOLIC BLOOD PRESSURE: 90 MMHG

## 2023-02-08 DIAGNOSIS — M54.50 ACUTE LOW BACK PAIN, UNSPECIFIED BACK PAIN LATERALITY, UNSPECIFIED WHETHER SCIATICA PRESENT: Primary | ICD-10-CM

## 2023-02-08 PROCEDURE — 99213 OFFICE O/P EST LOW 20 MIN: CPT | Performed by: PHYSICIAN ASSISTANT

## 2023-02-08 RX ORDER — MULTIVIT WITH MINERALS/LUTEIN
250 TABLET ORAL DAILY
Qty: 30 TABLET | Refills: 0 | Status: SHIPPED | OUTPATIENT
Start: 2023-02-08

## 2023-02-08 RX ORDER — IBUPROFEN 800 MG/1
800 TABLET, FILM COATED ORAL EVERY 8 HOURS PRN
Qty: 30 TABLET | Refills: 0 | Status: SHIPPED | OUTPATIENT
Start: 2023-02-08

## 2023-02-08 ASSESSMENT — ENCOUNTER SYMPTOMS: BACK PAIN: 1

## 2023-02-09 NOTE — PROGRESS NOTES
iSUBJECTIVE:   Brenda Ballesteros is a 47 year old female presenting with a chief complaint of   Chief Complaint   Patient presents with     Back Pain     Problems x 1 year from a fall at home, pain is bad today, went to a Ortho provider last week        She is an established patient of [facility].  Patient presents with requests of a new ortho person.  Patient is also requesting that I tell her what is wrong with her back.  Additionally, she would like a prescription for Vitamin C and ibuprofen.  She states that she has recently been in to see her ortho person and they told her the problem was from childhood and probably not from a fall a year ago.          Review of Systems   Musculoskeletal: Positive for back pain.   All other systems reviewed and are negative.      No past medical history on file.  No family history on file.  Current Outpatient Medications   Medication Sig Dispense Refill     buprenorphine HCl-naloxone HCl (SUBOXONE) 8-2 MG per film Place 1 Film under the tongue 2 times daily 14 Film 0     dextroamphetamine (DEXTROSTAT) 10 MG tablet Take 40 mg by mouth daily       ibuprofen (ADVIL/MOTRIN) 800 MG tablet Take 1 tablet (800 mg) by mouth every 8 hours as needed for moderate pain (4-6) 30 tablet 0     vitamin C (ASCORBIC ACID) 250 MG tablet Take 1 tablet (250 mg) by mouth daily 30 tablet 0     Ascorbic Acid (VITAMIN C) 500 MG CHEW Take 500 mg by mouth daily  (Patient not taking: Reported on 2/8/2023)       ibuprofen (ADVIL/MOTRIN) 600 MG tablet Take 1 tablet (600 mg) by mouth every 6 hours as needed for moderate pain (Patient not taking: Reported on 2/8/2023) 30 tablet 0     Social History     Tobacco Use     Smoking status: Every Day     Packs/day: 0.50     Types: Cigarettes     Smokeless tobacco: Never   Substance Use Topics     Alcohol use: No       OBJECTIVE  BP (!) 142/90 (BP Location: Left arm, Patient Position: Sitting, Cuff Size: Adult Large)   Pulse 95   Temp 98.7  F (37.1  C) (Tympanic)    Resp 12   LMP 02/06/2023 (Approximate)   SpO2 100%     Physical Exam  Vitals and nursing note reviewed.   Constitutional:       Appearance: Normal appearance. She is normal weight.      Comments: Patient rambles and has difficulty organizing thoughts.   Cardiovascular:      Rate and Rhythm: Normal rate.   Musculoskeletal:      Comments: Patient is able to ambulate, heel walk, toe walk, flex and extend.  Patient has significant scoliosis.  Skin over back is normal.     Neurological:      Mental Status: She is alert.         Labs:  No results found for this or any previous visit (from the past 24 hour(s)).    X-Ray was not done. As patient has already had CT, MRI.  Unable to view.      ASSESSMENT:      ICD-10-CM    1. Acute low back pain, unspecified back pain laterality, unspecified whether sciatica present  M54.50 Spine  Referral     ibuprofen (ADVIL/MOTRIN) 800 MG tablet     vitamin C (ASCORBIC ACID) 250 MG tablet           Medical Decision Making:    Differential Diagnosis:  Scoliosis.      Serious Comorbid Conditions:  Adult:  reviewed    PLAN:    Rx for ibuprofen and vitamin C as requested by patient.  Also requested by patient, referral to spine.      Followup:    If not improving or if condition worsens, follow up with your Primary Care Provider, If not improving or if conditions worsens over the next 12-24 hours, go to the Emergency Department    There are no Patient Instructions on file for this visit.

## 2023-06-01 ENCOUNTER — HEALTH MAINTENANCE LETTER (OUTPATIENT)
Age: 48
End: 2023-06-01

## 2023-11-07 NOTE — PROGRESS NOTES
SPIRITUAL HEALTH SERVICES    [facility] ([facility]) Unit [address]      REFERRAL SOURCE: patient/family request at admission for chaplaincy support    Staff consultation; patient declines and will make new request if desired.    PLAN: [facility] remains available to pt for the duration of hospitalization.                                                                                                                              Christine Gilmore MDiv, [facility]  Lead , Adult Mental Health and Addiction  Pager [phone]     Initial Size Of Lesion: 1

## 2024-01-13 ENCOUNTER — HEALTH MAINTENANCE LETTER (OUTPATIENT)
Age: 49
End: 2024-01-13

## 2024-08-10 ENCOUNTER — HEALTH MAINTENANCE LETTER (OUTPATIENT)
Age: 49
End: 2024-08-10

## 2025-08-16 ENCOUNTER — HEALTH MAINTENANCE LETTER (OUTPATIENT)
Age: 50
End: 2025-08-16